# Patient Record
Sex: MALE | Race: OTHER | Employment: FULL TIME | URBAN - METROPOLITAN AREA
[De-identification: names, ages, dates, MRNs, and addresses within clinical notes are randomized per-mention and may not be internally consistent; named-entity substitution may affect disease eponyms.]

---

## 2021-01-18 ENCOUNTER — APPOINTMENT (OUTPATIENT)
Dept: CT IMAGING | Age: 31
End: 2021-01-18

## 2021-01-18 ENCOUNTER — HOSPITAL ENCOUNTER (EMERGENCY)
Age: 31
Discharge: HOME | End: 2021-01-18

## 2021-01-18 ENCOUNTER — APPOINTMENT (OUTPATIENT)
Dept: GENERAL RADIOLOGY | Age: 31
End: 2021-01-18

## 2021-01-18 VITALS
TEMPERATURE: 98.8 F | SYSTOLIC BLOOD PRESSURE: 112 MMHG | RESPIRATION RATE: 16 BRPM | HEART RATE: 62 BPM | DIASTOLIC BLOOD PRESSURE: 62 MMHG | WEIGHT: 160 LBS | OXYGEN SATURATION: 100 %

## 2021-01-18 DIAGNOSIS — R10.84 GENERALIZED ABDOMINAL PAIN: ICD-10-CM

## 2021-01-18 DIAGNOSIS — I95.1 ORTHOSTASIS: ICD-10-CM

## 2021-01-18 DIAGNOSIS — Z20.822 PERSON UNDER INVESTIGATION FOR COVID-19: Primary | ICD-10-CM

## 2021-01-18 LAB
A/G RATIO: 1.5 (ref 1.1–2.2)
ALBUMIN SERPL-MCNC: 4.5 G/DL (ref 3.4–5)
ALP BLD-CCNC: 82 U/L (ref 40–129)
ALT SERPL-CCNC: 28 U/L (ref 10–40)
ANION GAP SERPL CALCULATED.3IONS-SCNC: 9 MMOL/L (ref 3–16)
AST SERPL-CCNC: 25 U/L (ref 15–37)
BASOPHILS ABSOLUTE: 0 K/UL (ref 0–0.2)
BASOPHILS RELATIVE PERCENT: 0.6 %
BILIRUB SERPL-MCNC: 0.5 MG/DL (ref 0–1)
BILIRUBIN URINE: NEGATIVE
BLOOD, URINE: NEGATIVE
BUN BLDV-MCNC: 7 MG/DL (ref 7–20)
CALCIUM SERPL-MCNC: 9.1 MG/DL (ref 8.3–10.6)
CHLORIDE BLD-SCNC: 99 MMOL/L (ref 99–110)
CLARITY: ABNORMAL
CO2: 30 MMOL/L (ref 21–32)
COLOR: YELLOW
CREAT SERPL-MCNC: 0.7 MG/DL (ref 0.9–1.3)
EKG ATRIAL RATE: 59 BPM
EKG DIAGNOSIS: NORMAL
EKG P AXIS: 57 DEGREES
EKG P-R INTERVAL: 130 MS
EKG Q-T INTERVAL: 384 MS
EKG QRS DURATION: 86 MS
EKG QTC CALCULATION (BAZETT): 380 MS
EKG R AXIS: 51 DEGREES
EKG T AXIS: 36 DEGREES
EKG VENTRICULAR RATE: 59 BPM
EOSINOPHILS ABSOLUTE: 0.1 K/UL (ref 0–0.6)
EOSINOPHILS RELATIVE PERCENT: 1.6 %
EPITHELIAL CELLS, UA: 1 /HPF (ref 0–5)
GFR AFRICAN AMERICAN: >60
GFR NON-AFRICAN AMERICAN: >60
GLOBULIN: 3.1 G/DL
GLUCOSE BLD-MCNC: 107 MG/DL (ref 70–99)
GLUCOSE URINE: NEGATIVE MG/DL
HCT VFR BLD CALC: 45.5 % (ref 40.5–52.5)
HEMOGLOBIN: 15.6 G/DL (ref 13.5–17.5)
HYALINE CASTS: 4 /LPF (ref 0–8)
KETONES, URINE: 15 MG/DL
LEUKOCYTE ESTERASE, URINE: NEGATIVE
LIPASE: 13 U/L (ref 13–60)
LYMPHOCYTES ABSOLUTE: 1.4 K/UL (ref 1–5.1)
LYMPHOCYTES RELATIVE PERCENT: 25.7 %
MCH RBC QN AUTO: 28.3 PG (ref 26–34)
MCHC RBC AUTO-ENTMCNC: 34.3 G/DL (ref 31–36)
MCV RBC AUTO: 82.4 FL (ref 80–100)
MICROSCOPIC EXAMINATION: YES
MONOCYTES ABSOLUTE: 0.5 K/UL (ref 0–1.3)
MONOCYTES RELATIVE PERCENT: 9.1 %
NEUTROPHILS ABSOLUTE: 3.4 K/UL (ref 1.7–7.7)
NEUTROPHILS RELATIVE PERCENT: 63 %
NITRITE, URINE: NEGATIVE
PDW BLD-RTO: 12.5 % (ref 12.4–15.4)
PH UA: 6.5 (ref 5–8)
PLATELET # BLD: 264 K/UL (ref 135–450)
PMV BLD AUTO: 7.7 FL (ref 5–10.5)
POTASSIUM REFLEX MAGNESIUM: 3.7 MMOL/L (ref 3.5–5.1)
PROTEIN UA: NEGATIVE MG/DL
RBC # BLD: 5.52 M/UL (ref 4.2–5.9)
RBC UA: 1 /HPF (ref 0–4)
SODIUM BLD-SCNC: 138 MMOL/L (ref 136–145)
SPECIFIC GRAVITY UA: 1.01 (ref 1–1.03)
TOTAL PROTEIN: 7.6 G/DL (ref 6.4–8.2)
URINE REFLEX TO CULTURE: ABNORMAL
URINE TYPE: ABNORMAL
UROBILINOGEN, URINE: 1 E.U./DL
WBC # BLD: 5.3 K/UL (ref 4–11)
WBC UA: 4 /HPF (ref 0–5)

## 2021-01-18 PROCEDURE — 71045 X-RAY EXAM CHEST 1 VIEW: CPT

## 2021-01-18 PROCEDURE — 83690 ASSAY OF LIPASE: CPT

## 2021-01-18 PROCEDURE — 93010 ELECTROCARDIOGRAM REPORT: CPT | Performed by: INTERNAL MEDICINE

## 2021-01-18 PROCEDURE — U0003 INFECTIOUS AGENT DETECTION BY NUCLEIC ACID (DNA OR RNA); SEVERE ACUTE RESPIRATORY SYNDROME CORONAVIRUS 2 (SARS-COV-2) (CORONAVIRUS DISEASE [COVID-19]), AMPLIFIED PROBE TECHNIQUE, MAKING USE OF HIGH THROUGHPUT TECHNOLOGIES AS DESCRIBED BY CMS-2020-01-R: HCPCS

## 2021-01-18 PROCEDURE — 74177 CT ABD & PELVIS W/CONTRAST: CPT

## 2021-01-18 PROCEDURE — 85025 COMPLETE CBC W/AUTO DIFF WBC: CPT

## 2021-01-18 PROCEDURE — 80053 COMPREHEN METABOLIC PANEL: CPT

## 2021-01-18 PROCEDURE — 99283 EMERGENCY DEPT VISIT LOW MDM: CPT

## 2021-01-18 PROCEDURE — 81001 URINALYSIS AUTO W/SCOPE: CPT

## 2021-01-18 PROCEDURE — 2580000003 HC RX 258: Performed by: PHYSICIAN ASSISTANT

## 2021-01-18 PROCEDURE — 6360000004 HC RX CONTRAST MEDICATION: Performed by: PHYSICIAN ASSISTANT

## 2021-01-18 PROCEDURE — 93005 ELECTROCARDIOGRAM TRACING: CPT | Performed by: STUDENT IN AN ORGANIZED HEALTH CARE EDUCATION/TRAINING PROGRAM

## 2021-01-18 RX ORDER — DICYCLOMINE HYDROCHLORIDE 10 MG/1
10 CAPSULE ORAL EVERY 6 HOURS PRN
Qty: 20 CAPSULE | Refills: 0 | Status: SHIPPED | OUTPATIENT
Start: 2021-01-18

## 2021-01-18 RX ORDER — SODIUM CHLORIDE, SODIUM LACTATE, POTASSIUM CHLORIDE, CALCIUM CHLORIDE 600; 310; 30; 20 MG/100ML; MG/100ML; MG/100ML; MG/100ML
1000 INJECTION, SOLUTION INTRAVENOUS ONCE
Status: COMPLETED | OUTPATIENT
Start: 2021-01-18 | End: 2021-01-18

## 2021-01-18 RX ADMIN — SODIUM CHLORIDE, POTASSIUM CHLORIDE, SODIUM LACTATE AND CALCIUM CHLORIDE 1000 ML: 600; 310; 30; 20 INJECTION, SOLUTION INTRAVENOUS at 10:30

## 2021-01-18 RX ADMIN — IOPAMIDOL 75 ML: 755 INJECTION, SOLUTION INTRAVENOUS at 10:47

## 2021-01-18 ASSESSMENT — PAIN DESCRIPTION - ORIENTATION: ORIENTATION: LEFT

## 2021-01-18 ASSESSMENT — ENCOUNTER SYMPTOMS
CHEST TIGHTNESS: 0
ABDOMINAL PAIN: 1
BACK PAIN: 0
SHORTNESS OF BREATH: 0
VOMITING: 0
DIARRHEA: 0
NAUSEA: 0

## 2021-01-18 ASSESSMENT — PAIN DESCRIPTION - LOCATION: LOCATION: ABDOMEN

## 2021-01-18 NOTE — ED NOTES
[facility]  #[id number], Reviewed discharge instructions with patient. No questions at this time. No sign of distress. AOx3. Pt ambulated to ER exit with steady gait.       Zee Rodrigez RN  01/18/21 5788

## 2021-01-18 NOTE — ED PROVIDER NOTES
[address]        Pt Name: Cici Avilez  MRN: [id number]  [place] 1990  Date of evaluation: 1/18/2021  Provider: Johanna Pompa PA-C  PCP: No primary care provider on file. LEONIDES. I have evaluated this patient. My supervising physician was available for consultation. CHIEF COMPLAINT       Chief Complaint   Patient presents with    Dizziness     Pt states he feels very weak and dizzy since Friday. Mild pain to lower abdomen, denies N/V. Pt states that he had taken a laxative before the symptoms started to restore his appetite as believed by his family it would help to do       HISTORY OF PRESENT ILLNESS   (Location, Timing/Onset, Context/Setting, Quality, Duration, Modifying Factors, Severity, Associated Signs and Symptoms)  Note limiting factors. Cici Avilez is a 27 y.o. male presents the emergency department with reports he has not been feeling well since last Friday. 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 He states he is having difficulties feeling light in the head. He states he has associated fatigue weakness and malaise. With this he is also experiencing mild body aches, intermittent generalized headache pain as well as left-sided abdominal pain. Patient states he has had mild nausea but has had no vomiting. It is reported that he did take some kind of laxative to restore his bowel health but is still having increasing symptoms. Pain and discomfort in his abdomen is left-sided. He rates it to be a 5 out of 10 and reports it is relatively constant in nature. He denies having had difficulty such as this in the past.  Despite the nursing note stating he is dizzy he is not experiencing any vertiginous dizziness. He denies visual disturbances.   He reports that he does have a normal gait. He was able to drive himself to the emergency department today. He does have associated fatigue malaise and weakness. He is not having fevers chills or cough. He denies chest pain palpitations lightheadedness or shortness of breath. He has no additional GI or  complaints voiced at present. Nursing Notes were all reviewed and agreed with or any disagreements were addressed in the HPI. REVIEW OF SYSTEMS    (2-9 systems for level 4, 10 or more for level 5)     Review of Systems   Constitutional: Positive for fatigue. Negative for activity change, chills and fever. Eyes: Negative for visual disturbance. Respiratory: Negative for chest tightness and shortness of breath. Cardiovascular: Negative for chest pain. Gastrointestinal: Positive for abdominal pain. Negative for diarrhea, nausea and vomiting. Genitourinary: Negative for dysuria and flank pain. Musculoskeletal: Negative for back pain. Skin: Negative for rash and wound. Neurological: Positive for weakness, light-headedness and headaches. Negative for dizziness, seizures, syncope, speech difficulty and numbness. Positives and Pertinent negatives as per HPI. Except as noted above in the ROS, all other systems were reviewed and negative. PAST MEDICAL HISTORY   History reviewed. No pertinent past medical history. SURGICAL HISTORY   History reviewed. No pertinent surgical history. CURRENTMEDICATIONS       Previous Medications    No medications on file         ALLERGIES     Patient has no known allergies. FAMILYHISTORY     History reviewed. No pertinent family history.        SOCIAL HISTORY       Social History     Tobacco Use    Smoking status: Never Smoker   Substance Use Topics    Alcohol use: Not Currently    Drug use: Not Currently       SCREENINGS             PHYSICAL EXAM    (up to 7 for level 4, 8 or more for level 5)     ED Triage Vitals [01/18/21 0839]   BP Temp Temp Source Pulse Resp SpO2 Height Weight   132/77 98.6 °F (37 °C) Temporal 71 16 98 % -- 160 lb (72.6 kg)       Physical Exam  Vitals signs and nursing note reviewed. Constitutional:       General: He is awake. He is not in acute distress. Appearance: Normal appearance. He is well-developed. He is not ill-appearing or diaphoretic. Comments: Resting comfortably in the examination room in no evidence of acute painful distress. HENT:      Head: Normocephalic and atraumatic. No raccoon eyes, Alarcon's sign, contusion or laceration. Right Ear: Hearing and external ear normal.      Left Ear: Hearing and external ear normal.      Nose: Nose normal.      Mouth/Throat:      Lips: Pink. Mouth: Mucous membranes are moist.   Eyes:      General: Lids are normal. No scleral icterus. Right eye: No discharge. Left eye: No discharge. Conjunctiva/sclera: Conjunctivae normal.      Right eye: Right conjunctiva is not injected. Left eye: Left conjunctiva is not injected. Pupils: Pupils are equal, round, and reactive to light. Neck:      Musculoskeletal: Normal range of motion. Vascular: No JVD. Cardiovascular:      Rate and Rhythm: Normal rate and regular rhythm. Heart sounds: No murmur. No friction rub. No gallop. Pulmonary:      Effort: Pulmonary effort is normal. No accessory muscle usage or respiratory distress. Breath sounds: Normal breath sounds. No wheezing, rhonchi or rales. Abdominal:      General: Abdomen is flat. Bowel sounds are normal. There is no distension. Palpations: Abdomen is soft. Abdomen is not rigid. There is no mass. Tenderness: There is generalized abdominal tenderness and tenderness in the left upper quadrant and left lower quadrant. There is no right CVA tenderness, left CVA tenderness, guarding or rebound. Skin:     General: Skin is warm and dry. Neurological:      Mental Status: He is alert and oriented to person, place, and time.       GCS: GCS eye subscore is 4. GCS verbal subscore is 5. GCS motor subscore is 6. Cranial Nerves: No cranial nerve deficit. Sensory: No sensory deficit. Coordination: Coordination normal.   Psychiatric:         Behavior: Behavior normal. Behavior is cooperative. DIAGNOSTIC RESULTS   LABS:    Labs Reviewed   COMPREHENSIVE METABOLIC PANEL W/ REFLEX TO MG FOR LOW K - Abnormal; Notable for the following components:       Result Value    Glucose 107 (*)     CREATININE 0.7 (*)     All other components within normal limits    Narrative:     Performed at:  [facility] [address]   Phone [phone]   URINE RT REFLEX TO CULTURE - Abnormal; Notable for the following components:    Clarity, UA CLOUDY (*)     Ketones, Urine 15 (*)     All other components within normal limits    Narrative:     Performed at:  [facility] [address]   Phone [phone]   CBC WITH AUTO DIFFERENTIAL    Narrative:     Performed at:  [facility] [address]   Phone [phone]   LIPASE    Narrative:     Performed at:  [facility] [email or web address]   Phone [phone]   MICROSCOPIC URINALYSIS    Narrative:     Performed at:  [facility] [address]   Phone [phone]       All other labs were within normal range or not returned as of this dictation. EKG: All EKG's are interpreted by the Emergency Department Physician in the absence of a cardiologist.  Please see their note for interpretation of EKG.       RADIOLOGY:   Non-plain film images such as CT, Ultrasound and MRI are read by the radiologist. Plain radiographic images are visualized and preliminarily interpreted by the ED Provider with the below findings:        Interpretation per the Radiologist below, if available at the time of this note:    CT ABDOMEN PELVIS W IV CONTRAST Additional Contrast? None   Preliminary Result   No acute intra-abdominal or intrapelvic abnormality noted. XR CHEST PORTABLE   Final Result   No acute cardiopulmonary pathology. Xr Chest Portable    Result Date: 1/18/2021  EXAMINATION: ONE XRAY VIEW OF THE CHEST 1/18/2021 9:39 am COMPARISON: None. HISTORY: ORDERING SYSTEM PROVIDED HISTORY: weakness TECHNOLOGIST PROVIDED HISTORY: Reason for exam:->weakness Reason for Exam: Dizziness Acuity: Unknown Type of Exam: Unknown FINDINGS: Single portable frontal view of the chest is submitted for review. The cardiac silhouette is normal in size. Lung parenchyma is clear without focal airspace consolidation, sizeable pleural effusion, or pneumothorax. Trachea is midline. Visualized osseous structures and soft tissues are grossly intact. No acute cardiopulmonary pathology. PROCEDURES   Unless otherwise noted below, none     Procedures    CRITICAL CARE TIME   N/A    CONSULTS:  None      EMERGENCY DEPARTMENT COURSE and DIFFERENTIAL DIAGNOSIS/MDM:   Vitals:    Vitals:    01/18/21 0839 01/18/21 1054 01/18/21 1056 01/18/21 1059   BP: 132/77 119/61  121/61   Pulse: 71 72  57   Resp: 16 18 18   Temp: 98.6 °F (37 °C) 98.7 °F (37.1 °C)     TempSrc: Temporal Oral     SpO2: 98% 99% 99% 99%   Weight: 160 lb (72.6 kg)          Patient was given the following medications:  Medications   lactated ringers infusion 1,000 mL (1,000 mLs Intravenous New Bag 1/18/21 1030)   iopamidol (ISOVUE-370) 76 % injection 75 mL (75 mLs Intravenous Given 1/18/21 1047)           The patient's detailed history of present illness is documented as above. Upon arrival to the emergency department the patient's vital signs are as documented. The patient is noted to be hemodynamically stable and afebrile. Physical examination findings are as above. IV access was obtained. Laboratory testing and work-up was initiated. Patient was experiencing symptoms of orthostasis and was given IV fluids as documented above. Initial EKG demonstrates a sinus bradycardia and a healthy athletic  with a rate of 59. He has normal axis and interval.  He does have some early repolarization noted laterally. There is 1 mm ST elevation noted in V3 with no reciprocal change. Please see attending physician details for further EKG interpretation as well as comparison. CBC demonstrates no evidence of leukocytosis anemia and/or thrombocytopenia. BUN is 9 creatinine is 0.7 nonfasting glucose is 107 electrolytes LFTs unremarkable. Lipase 13. UA negative for infection but does demonstrate mild dehydration with ketones. Portable chest x-ray demonstrates no evidence of acute cardiopulmonary process well CT imaging the abdomen and pelvis with IV contrast demonstrates no evidence of intra-abdominal or intrapelvic pathology. I discussed with the patient ongoing care management on outpatient basis. He is still a person under investigation for the possibility of Covid. He is aware what this means. Ongoing care management with appropriate referral to a primary care physician. I estimate there is low risk for acute appendicitis, bowel obstruction, acute cholecystitis, acute choledocholithiasis and/or cholangitis, diverticulitis incarcerated hernia, pancreatitis, perforated bowel, and or ulcer disease thus I consider the discharge disposition reasonable. Also, there is no evidence or peritonitis, sepsis, or toxicity. The patient and/or family and I have discussed the diagnosis and risks, and we agree with discharging home to follow-up with their primary doctor. We also discussed returning to the Emergency Department immediately if new or worsening symptoms occur.  We have discussed the symptoms which are most concerning (e.g., bloody stool, fever, changing or worsening pain, vomiting) that necessitate immediate return. FINAL IMPRESSION      1. Person under investigation for COVID-19    2. Generalized abdominal pain    3.  Orthostasis          DISPOSITION/PLAN   DISPOSITION: Discharged to home      PATIENT REFERREDTO:  [facility] Emergency Department  [address] Pre-Services  [phone]          DISCHARGE MEDICATIONS:  New Prescriptions    DICYCLOMINE (BENTYL) 10 MG CAPSULE    Take 1 capsule by mouth every 6 hours as needed (cramps)       DISCONTINUED MEDICATIONS:  Discontinued Medications    No medications on file              (Please note that portions of this note were completed with a voice recognition program.  Efforts were made to edit the dictations but occasionally words are mis-transcribed.)    Gayle Massey PA-C (electronically signed)           Finn Kearney PA-C  01/18/21 4714

## 2021-01-18 NOTE — LETTER
[facility] Emergency Department  [address], [place], [address]             January 18, 2021    Patient: Tayo Coburn   YOB: 1990   Date of Visit: 1/18/2021       To Whom It May Concern:    Tayo Coburn was seen and treated in our emergency department on 1/18/2021. He may return to work on 01/28/21 or until COVID test result is negative.       Sincerely,         ER RN

## 2021-01-19 LAB — SARS-COV-2: NOT DETECTED
